# Patient Record
Sex: MALE | Race: WHITE | Employment: FULL TIME | URBAN - METROPOLITAN AREA
[De-identification: names, ages, dates, MRNs, and addresses within clinical notes are randomized per-mention and may not be internally consistent; named-entity substitution may affect disease eponyms.]

---

## 2017-03-28 ENCOUNTER — EMPLOYEE WELLNESS (OUTPATIENT)
Dept: OTHER | Age: 47
End: 2017-03-28

## 2018-03-16 ENCOUNTER — HOSPITAL ENCOUNTER (OUTPATIENT)
Age: 48
Discharge: HOME | End: 2018-03-16
Payer: COMMERCIAL

## 2018-03-16 LAB
ANION GAP SERPL CALCULATED.3IONS-SCNC: 10 MMOL/L (ref 7–16)
BUN BLDV-MCNC: 15 MG/DL (ref 6–20)
CALCIUM SERPL-MCNC: 8.8 MG/DL (ref 8.6–10.2)
CHLORIDE BLD-SCNC: 101 MMOL/L (ref 98–107)
CO2: 28 MMOL/L (ref 22–29)
CREAT SERPL-MCNC: 0.9 MG/DL (ref 0.7–1.2)
GFR AFRICAN AMERICAN: >60
GFR NON-AFRICAN AMERICAN: >60 ML/MIN/1.73
GLUCOSE BLD-MCNC: 148 MG/DL (ref 74–109)
HBA1C MFR BLD: 9 % (ref 4.8–5.9)
POTASSIUM SERPL-SCNC: 4.2 MMOL/L (ref 3.5–5)
SODIUM BLD-SCNC: 139 MMOL/L (ref 132–146)
VITAMIN D 25-HYDROXY: 15 NG/ML (ref 30–100)

## 2018-03-16 PROCEDURE — 36415 COLL VENOUS BLD VENIPUNCTURE: CPT

## 2018-03-16 PROCEDURE — 82306 VITAMIN D 25 HYDROXY: CPT

## 2018-03-16 PROCEDURE — 80048 BASIC METABOLIC PNL TOTAL CA: CPT

## 2018-03-16 PROCEDURE — 83036 HEMOGLOBIN GLYCOSYLATED A1C: CPT

## 2018-03-20 VITALS — WEIGHT: 277 LBS | BODY MASS INDEX: 37.57 KG/M2

## 2018-04-21 ENCOUNTER — EMPLOYEE WELLNESS (OUTPATIENT)
Dept: INTERNAL MEDICINE CLINIC | Age: 48
End: 2018-04-21

## 2018-04-21 LAB
CHOLESTEROL, TOTAL: 231 MG/DL (ref 0–199)
GLUCOSE BLD-MCNC: 178 MG/DL (ref 74–107)
HDLC SERPL-MCNC: 41 MG/DL
LDL CHOLESTEROL CALCULATED: 144 MG/DL (ref 0–99)
TRIGL SERPL-MCNC: 228 MG/DL (ref 0–149)

## 2018-04-22 LAB — HBA1C MFR BLD: 8.7 % (ref 4.8–5.9)

## 2018-05-02 VITALS — WEIGHT: 284 LBS | BODY MASS INDEX: 39.06 KG/M2

## 2018-06-07 ENCOUNTER — HOSPITAL ENCOUNTER (OUTPATIENT)
Age: 48
Discharge: HOME | End: 2018-06-07
Payer: COMMERCIAL

## 2018-06-07 LAB
ANION GAP SERPL CALCULATED.3IONS-SCNC: 12 MMOL/L (ref 7–16)
BUN BLDV-MCNC: 15 MG/DL (ref 6–20)
CALCIUM SERPL-MCNC: 8.8 MG/DL (ref 8.6–10.2)
CHLORIDE BLD-SCNC: 100 MMOL/L (ref 98–107)
CO2: 26 MMOL/L (ref 22–29)
CREAT SERPL-MCNC: 0.8 MG/DL (ref 0.7–1.2)
GFR AFRICAN AMERICAN: >60
GFR NON-AFRICAN AMERICAN: >60 ML/MIN/1.73
GLUCOSE BLD-MCNC: 212 MG/DL (ref 74–109)
HBA1C MFR BLD: 9.1 % (ref 4.8–5.9)
POTASSIUM SERPL-SCNC: 4.2 MMOL/L (ref 3.5–5)
SODIUM BLD-SCNC: 138 MMOL/L (ref 132–146)
VITAMIN D 25-HYDROXY: 20 NG/ML (ref 30–100)

## 2018-06-07 PROCEDURE — 36415 COLL VENOUS BLD VENIPUNCTURE: CPT

## 2018-06-07 PROCEDURE — 82306 VITAMIN D 25 HYDROXY: CPT

## 2018-06-07 PROCEDURE — 83036 HEMOGLOBIN GLYCOSYLATED A1C: CPT

## 2018-06-07 PROCEDURE — 80048 BASIC METABOLIC PNL TOTAL CA: CPT

## 2018-08-21 ENCOUNTER — HOSPITAL ENCOUNTER (OUTPATIENT)
Dept: DIABETES SERVICES | Age: 48
Setting detail: THERAPIES SERIES
Discharge: HOME | End: 2018-08-21
Payer: COMMERCIAL

## 2018-08-21 PROCEDURE — G0109 DIAB MANAGE TRN IND/GROUP: HCPCS | Performed by: DIETITIAN, REGISTERED

## 2018-08-22 ENCOUNTER — HOSPITAL ENCOUNTER (OUTPATIENT)
Dept: DIABETES SERVICES | Age: 48
Setting detail: THERAPIES SERIES
Discharge: HOME | End: 2018-08-22
Payer: COMMERCIAL

## 2018-08-22 PROCEDURE — G0109 DIAB MANAGE TRN IND/GROUP: HCPCS

## 2018-08-24 NOTE — PROGRESS NOTES
1 8/21/18  BDK  3 Testing once a week   Prevention, detection & treatment of acute complications:  Identify symptoms of hyper & hypoglycemia, and prevention & treatment strategies. Describe sick day guidelines & indications for ketone testing & physician notification. Identify short term consequences of poor control. 1 8/21/18  BDK  3    Prevention, detection & treatment of chronic complications:  Define the natural course of diabetes & describe the relationship of blood glucose levels to long term complications of diabetes. Identify preventative measures & standards of care. 1 8/21/18  BDK  3    Developing strategies to address psychosocial issues:  Describe feelings about living with diabetes; Describe how stress, depression & anxiety affect blood glucose; Identify coping strategies; Identify support needed & support network available. 1 8/21/18  BDK  3 PHQ-9 Depression Screen Score: 0  []Physician notified of suicidal ideations   Developing strategies to promote health/change behavior: Identify 7 self-care behaviors; Personal health risk factors; Benefits, challenges & strategies for behavioral change; Individualized goal selection.  1 8/22/18-JA  4 Goal: Test blood sugar in AM and 2 hours after dinner, 5 times a week     Identified Barriers to learning/adherence to self management plan:     [x]None  []Physical  []Visual  []Hearing  []Speech  []Emotional  []Cognitive    []Reading  []Language  []Accessibility  []Financial    Instruction Method: [x]Lecture/Discussion  [x]PowerPoint Presentation  [x]Handouts   []Return Demonstration  Education Materials/Equipment Provided:  [x]Self-Management Manual  []Meal Plan []Glucose Meter  []Insulin Kit  []Nutritional Packet  []Survival Packet   []Other  [] Gestational Pathway Initiated & Signed    Encounter Type Date Start Time End Time Comments No Show Dates   Assessment 8/21/18  BDK 1800 0635   [x]In Person  []Telephone    Session 1 8/21/18 BDK [id number]      Session 2 8/22/18  MAYCOK             1800  [id number]  2030     Session 3         Individual MNT         Gestational MNT         Shared Med Appt         Yearly Follow-up        Meter Instrx        Insulin Instrx      []Pen  []Vial & Syringe      Additional Comments: 8/21/18  Antoinette Siemens attended session one with his significant other. Very attentive.  BDK, Pt did not attend class 3, plans on attended a 1:1 appointment to get his meal plan instruction    DSMS Support Plan:  Follow-up plan/Date: 11/2018  Contact Post Class Regarding:   Fasting Blood Sugar   HgbA1C   Weight   Hypertension/Follow-up with Physician   Self-Foot Exam Frequency   Monitoring Frequency   Exercise Routine   Goal Attainment  Post Education Referrals:      []WIC   []PAP   []Wound Care   []Social Service   [] [facility]ara Silver[facility] Specialist   [][facility]   []Sleep Lab   []Other

## 2018-12-06 ENCOUNTER — HOSPITAL ENCOUNTER (OUTPATIENT)
Age: 48
Discharge: HOME | End: 2018-12-06
Payer: COMMERCIAL

## 2018-12-06 ENCOUNTER — HOSPITAL ENCOUNTER (OUTPATIENT)
Dept: DIABETES SERVICES | Age: 48
Setting detail: THERAPIES SERIES
Discharge: HOME | End: 2018-12-06
Payer: COMMERCIAL

## 2018-12-06 LAB
ANION GAP SERPL CALCULATED.3IONS-SCNC: 9 MMOL/L (ref 7–16)
BUN BLDV-MCNC: 14 MG/DL (ref 6–20)
CALCIUM SERPL-MCNC: 8.9 MG/DL (ref 8.6–10.2)
CHLORIDE BLD-SCNC: 101 MMOL/L (ref 98–107)
CHOLESTEROL, TOTAL: 214 MG/DL (ref 0–199)
CO2: 27 MMOL/L (ref 22–29)
CREAT SERPL-MCNC: 0.8 MG/DL (ref 0.7–1.2)
GFR AFRICAN AMERICAN: >60
GFR NON-AFRICAN AMERICAN: >60 ML/MIN/1.73
GLUCOSE BLD-MCNC: 177 MG/DL (ref 74–99)
HBA1C MFR BLD: 8.7 % (ref 4–5.6)
HDLC SERPL-MCNC: 40 MG/DL
LDL CHOLESTEROL CALCULATED: 145 MG/DL (ref 0–99)
MICROALBUMIN UR-MCNC: <12 MG/L
POTASSIUM SERPL-SCNC: 4.3 MMOL/L (ref 3.5–5)
SODIUM BLD-SCNC: 137 MMOL/L (ref 132–146)
TRIGL SERPL-MCNC: 146 MG/DL (ref 0–149)
VITAMIN D 25-HYDROXY: 18 NG/ML (ref 30–100)
VLDLC SERPL CALC-MCNC: 29 MG/DL

## 2018-12-06 PROCEDURE — 97804 MEDICAL NUTRITION GROUP: CPT

## 2018-12-06 PROCEDURE — 36415 COLL VENOUS BLD VENIPUNCTURE: CPT

## 2018-12-06 PROCEDURE — 80048 BASIC METABOLIC PNL TOTAL CA: CPT

## 2018-12-06 PROCEDURE — 83036 HEMOGLOBIN GLYCOSYLATED A1C: CPT

## 2018-12-06 PROCEDURE — 80061 LIPID PANEL: CPT

## 2018-12-06 PROCEDURE — 82306 VITAMIN D 25 HYDROXY: CPT

## 2018-12-06 PROCEDURE — 82044 UR ALBUMIN SEMIQUANTITATIVE: CPT

## 2018-12-06 NOTE — PROGRESS NOTES
Diabetes Self-Management Education Record    Participant Name: Sara Reed  Referring Provider: Lanre Julien DO  Assessment/Evaluation Ratings:  1=Needs Instruction   4=Demonstrates Understanding/Competency  2=Needs Review   NC=Not Covered    3=Comprehends Key Points  N/A=Not Applicable  Topics/Learning Objectives Pre-session Assess Date:  8/21/18 BDK Instr. Date Reinforce Date Post- session Eval Comments   Diabetes disease process & Treatment process: Define diabetes & pre-diabetes; Identify own type of diabetes; role of the pancreas; signs/symptoms; diagnostic criteria; prevention & treatment options; contributing factors. 1 8/21/18  BDK  3 Type 2      Incorporating nutritional management into lifestyle: Describe effect of type, amount & timing of food on blood glucose; Describe basic meal planning techniques & current nutrition guidelines;Correctly read food labels & demonstrate CHO counting & portion control with personalized meal plan. Identify dining out strategies, & dietary sick day guidelines. 1 8/22/18-JA            12/6/18 DR  3            4 8/22/18 Attended with wife, followed along with handouts,made comments      12/6/18 Pt participated in group activities for meal planning. Instruction provided on a carb-consistent meal plan. Pt was able to choose correct amount of carbohydrate at meals using food models and carbohydrate cheat sheet. Followed along and took notes. Incorporating physical activity into lifestyle:   Verbalize effect of exercise on blood glucose levels; benefits of regular exercise; safety considerations; contraindications; maintenance of activity. 1 8/21/18  BDK  3 none   Using medications safely:  Identify effects of diabetes medicines on blood glucose levels; List diabetes medication taken, action & side effects; appropriate injection sites; proper storage; supplies needed; proper technique; safe needle disposal guidelines.  2 8/21/18  BDK  3 Metformin ER   Monitoring blood

## 2019-03-14 ENCOUNTER — HOSPITAL ENCOUNTER (OUTPATIENT)
Age: 49
Discharge: HOME | End: 2019-03-14
Payer: COMMERCIAL

## 2019-03-14 LAB
ALBUMIN SERPL-MCNC: 4.3 G/DL (ref 3.5–5.2)
ALP BLD-CCNC: 89 U/L (ref 40–129)
ALT SERPL-CCNC: 24 U/L (ref 0–40)
ANION GAP SERPL CALCULATED.3IONS-SCNC: 12 MMOL/L (ref 7–16)
AST SERPL-CCNC: 15 U/L (ref 0–39)
BILIRUB SERPL-MCNC: 0.6 MG/DL (ref 0–1.2)
BILIRUBIN URINE: NEGATIVE
BLOOD, URINE: NEGATIVE
BUN BLDV-MCNC: 15 MG/DL (ref 6–20)
CALCIUM SERPL-MCNC: 8.9 MG/DL (ref 8.6–10.2)
CHLORIDE BLD-SCNC: 100 MMOL/L (ref 98–107)
CHOLESTEROL, TOTAL: 229 MG/DL (ref 0–199)
CLARITY: CLEAR
CO2: 27 MMOL/L (ref 22–29)
COLOR: YELLOW
CREAT SERPL-MCNC: 0.8 MG/DL (ref 0.7–1.2)
GFR AFRICAN AMERICAN: >60
GFR NON-AFRICAN AMERICAN: >60 ML/MIN/1.73
GLUCOSE BLD-MCNC: 188 MG/DL (ref 74–99)
GLUCOSE URINE: NEGATIVE MG/DL
HBA1C MFR BLD: 7.8 % (ref 4–5.6)
HCT VFR BLD CALC: 46.4 % (ref 37–54)
HDLC SERPL-MCNC: 42 MG/DL
HEMOGLOBIN: 15 G/DL (ref 12.5–16.5)
KETONES, URINE: NEGATIVE MG/DL
LDL CHOLESTEROL CALCULATED: 153 MG/DL (ref 0–99)
LEUKOCYTE ESTERASE, URINE: NEGATIVE
MCH RBC QN AUTO: 27.6 PG (ref 26–35)
MCHC RBC AUTO-ENTMCNC: 32.3 % (ref 32–34.5)
MCV RBC AUTO: 85.5 FL (ref 80–99.9)
NITRITE, URINE: NEGATIVE
PDW BLD-RTO: 13.6 FL (ref 11.5–15)
PH UA: 5 (ref 5–9)
PLATELET # BLD: 366 E9/L (ref 130–450)
PMV BLD AUTO: 10.5 FL (ref 7–12)
POTASSIUM SERPL-SCNC: 4.3 MMOL/L (ref 3.5–5)
PROTEIN UA: NEGATIVE MG/DL
RBC # BLD: 5.43 E12/L (ref 3.8–5.8)
SODIUM BLD-SCNC: 139 MMOL/L (ref 132–146)
SPECIFIC GRAVITY UA: >=1.03 (ref 1–1.03)
TOTAL PROTEIN: 7.3 G/DL (ref 6.4–8.3)
TRIGL SERPL-MCNC: 172 MG/DL (ref 0–149)
TSH SERPL DL<=0.05 MIU/L-ACNC: 1.48 UIU/ML (ref 0.27–4.2)
UROBILINOGEN, URINE: 0.2 E.U./DL
VLDLC SERPL CALC-MCNC: 34 MG/DL
WBC # BLD: 9.5 E9/L (ref 4.5–11.5)

## 2019-03-14 PROCEDURE — 83036 HEMOGLOBIN GLYCOSYLATED A1C: CPT

## 2019-03-14 PROCEDURE — 80061 LIPID PANEL: CPT

## 2019-03-14 PROCEDURE — 36415 COLL VENOUS BLD VENIPUNCTURE: CPT

## 2019-03-14 PROCEDURE — 81003 URINALYSIS AUTO W/O SCOPE: CPT

## 2019-03-14 PROCEDURE — 84443 ASSAY THYROID STIM HORMONE: CPT

## 2019-03-14 PROCEDURE — 85027 COMPLETE CBC AUTOMATED: CPT

## 2019-03-14 PROCEDURE — 80053 COMPREHEN METABOLIC PANEL: CPT

## 2019-03-18 ENCOUNTER — HOSPITAL ENCOUNTER (OUTPATIENT)
Dept: GENERAL RADIOLOGY | Age: 49
Discharge: HOME | End: 2019-03-20
Payer: COMMERCIAL

## 2019-03-18 ENCOUNTER — HOSPITAL ENCOUNTER (OUTPATIENT)
Age: 49
Discharge: HOME | End: 2019-03-20
Payer: COMMERCIAL

## 2019-03-18 DIAGNOSIS — M25.531 RIGHT WRIST PAIN: ICD-10-CM

## 2019-03-18 PROCEDURE — 73110 X-RAY EXAM OF WRIST: CPT

## 2019-04-18 ENCOUNTER — OFFICE VISIT (OUTPATIENT)
Dept: ORTHOPEDIC SURGERY | Age: 49
End: 2019-04-18
Payer: COMMERCIAL

## 2019-04-18 VITALS
WEIGHT: 280 LBS | TEMPERATURE: 97.4 F | DIASTOLIC BLOOD PRESSURE: 76 MMHG | RESPIRATION RATE: 18 BRPM | HEART RATE: 66 BPM | BODY MASS INDEX: 37.93 KG/M2 | SYSTOLIC BLOOD PRESSURE: 117 MMHG | HEIGHT: 72 IN

## 2019-04-18 DIAGNOSIS — S63.591A TFCC (TRIANGULAR FIBROCARTILAGE COMPLEX) TEAR, RIGHT, INITIAL ENCOUNTER: Primary | ICD-10-CM

## 2019-04-18 PROCEDURE — 99203 OFFICE O/P NEW LOW 30 MIN: CPT | Performed by: ORTHOPAEDIC SURGERY

## 2019-04-18 PROCEDURE — L3908 WHO COCK-UP NONMOLDE PRE OTS: HCPCS | Performed by: ORTHOPAEDIC SURGERY

## 2019-04-18 PROCEDURE — 20605 DRAIN/INJ JOINT/BURSA W/O US: CPT | Performed by: ORTHOPAEDIC SURGERY

## 2019-04-18 RX ORDER — EZETIMIBE 10 MG/1
TABLET ORAL
COMMUNITY
Start: 2019-03-18

## 2019-04-18 RX ORDER — BETAMETHASONE SODIUM PHOSPHATE AND BETAMETHASONE ACETATE 3; 3 MG/ML; MG/ML
6 INJECTION, SUSPENSION INTRA-ARTICULAR; INTRALESIONAL; INTRAMUSCULAR; SOFT TISSUE ONCE
Status: COMPLETED | OUTPATIENT
Start: 2019-04-18 | End: 2019-04-18

## 2019-04-18 RX ADMIN — BETAMETHASONE SODIUM PHOSPHATE AND BETAMETHASONE ACETATE 6 MG: 3; 3 INJECTION, SUSPENSION INTRA-ARTICULAR; INTRALESIONAL; INTRAMUSCULAR; SOFT TISSUE at 15:22

## 2019-04-18 NOTE — PROGRESS NOTES
Department of Orthopedic Surgery/Hand Surgery  Resident Office Note        CHIEF COMPLAINT:    Chief Complaint   Patient presents with    Wrist Pain     Right wrist pain       History Obtained From:  patient    HISTORY OF PRESENT ILLNESS:                Daily Rivas is a 57y.o. year old  male complaining this pain for the last 4 months. Having right is pain occasionally during activities. Patient states the pain comes and goes and is not constant. Patient's not tried any bracing or oral NSAIDs at this time. States that rest improves the pain. And activities aggravates the right wrist. Patient denies any previous injuries to the right wrist. Patient's right dominant. Patient works as an IT        Past Medical History:    Past Medical History:   Diagnosis Date    Gallstone     with nausea    H/O seasonal allergies      Past Surgical History:    Past Surgical History:   Procedure Laterality Date    CHOLECYSTECTOMY, LAPAROSCOPIC  1/10/14     Current Medications:   Current Facility-Administered Medications: betamethasone acetate-betamethasone sodium phosphate (CELESTONE) injection 6 mg, 6 mg, Intra-articular, Once  Allergies:     Allergies   Allergen Reactions    Glipizide Other (See Comments)     Extreme muscle pain    Simvastatin Diarrhea       Social History:   Social History     Socioeconomic History    Marital status:      Spouse name: Not on file    Number of children: Not on file    Years of education: Not on file    Highest education level: Not on file   Occupational History    Not on file   Social Needs    Financial resource strain: Not on file    Food insecurity:     Worry: Not on file     Inability: Not on file    Transportation needs:     Medical: Not on file     Non-medical: Not on file   Tobacco Use    Smoking status: Never Smoker    Smokeless tobacco: Never Used   Substance and Sexual Activity    Alcohol use: Yes     Comment: seldom    Drug use: No    Sexual activity: Not on file   Lifestyle    Physical activity:     Days per week: Not on file     Minutes per session: Not on file    Stress: Not on file   Relationships    Social connections:     Talks on phone: Not on file     Gets together: Not on file     Attends Anabaptist service: Not on file     Active member of club or organization: Not on file     Attends meetings of clubs or organizations: Not on file     Relationship status: Not on file    Intimate partner violence:     Fear of current or ex partner: Not on file     Emotionally abused: Not on file     Physically abused: Not on file     Forced sexual activity: Not on file   Other Topics Concern    Not on file   Social History Narrative    Not on file     Family History:   History reviewed. No pertinent family history. REVIEW OF SYSTEMS:    CONSTITUTIONAL:  negative for  fevers, chills, sweats and fatigue  RESPIRATORY:  negative for  dry cough, cough with sputum, dyspnea, wheezing and chest pain  CARDIOVASCULAR:  negative for  chest pain, dyspnea, palpitations, syncope  GASTROINTESTINAL:  negative for nausea, vomiting, change in bowel habits, diarrhea, constipation and abdominal pain  BEHAVIOR/PSYCH:  negative for poor appetite, increased appetite, decreased sleep and poor concentration  MUSCULOSKELETAL:  positive for  pain      PHYSICAL EXAM:    VITALS:  /76 (Site: Left Upper Arm, Position: Sitting)   Pulse 66   Temp 97.4 °F (36.3 °C) (Oral)   Resp 18   Ht 6' (1.829 m)   Wt 280 lb (127 kg)   BMI 37.97 kg/m²     CONSTITUTIONAL:  awake, alert, cooperative, no apparent distress, and appears stated age    LUNGS:  No increased work of breathing, good air exchange, clear to auscultation bilaterally, no crackles or wheezing    CARDIOVASCULAR:  Normal apical impulse, regular rate and rhythm, normal S1 and S2, no S3 or S4, and no murmur noted    ABDOMEN: Soft, Non-tender to palpation       right upper exam:    The skin overlying the hand is  intact.    The motion in the small joints of the hand are intact with no stiffness or deformity. There is no masses or adenopathy in bilateral upper extremities. Radial pulses are 2+ and symmetric bilaterally. Capillary refill is intact and < 2 seconds. Motor strength is 5/5 with flexion and extension of the small finger joints. The ROM to fingers are normal. Positive synergistic test, positive tenderness at ECU tendon. Negative tenderness of the medial lateral condyles of the elbow, positive Tinel's at the elbow negative tenderness with resisted wrist extension and wrist flexion, negative tenderness with resisted extension of the index and middle fingers, positive fovea tenderness     Right:  Phallens sign positive, Tinnells sign positive, Median nerve compression test negative ,  Finklesteins negative, CMC Grind test negative,       DATA:    Radiology Review:  right wirst XR - 3 views - AP/Lateral/Oblique    In no acute fractures dislocations, no known arthritic changes, no soft tissue swelling  Impression : Normal right wrist x-ray    IMPRESSION/RECOMMENDATIONS:      Impression:   Encounter Diagnosis   Name Primary?  TFCC (triangular fibrocartilage complex) tear, right, initial encounter Yes       Discussed diagnosis and treatment options with patient today. We'll recommend a TFCC cortisone injection. And wrist immobilization. Patient agreement with this plan. Patient can follow-up in 4-6 weeks for repeat evaluation. If patient is asymptomatic patient appointment time. Procedure Note TFCC Injection     The Right TFCC was identified as the injection site. The risk and benefits of a cortisone injection were explained and the patient consented to the injection. Under sterile conditions, the digit was injected with a mixture of 1 mL of 1% Lidocaine and 1 mL of 6 mg/mL Betamethasone without complication. A sterile bandage was applied.     I have seen and evaluated the patient and agree with the above assessment and plan on today's visit. I have performed the key components of the history and physical examination with significant findings of right ulnar-sided wrist pain/TFCC tear. I concur with the findings and plan as documented.     Oniel Barakat MD  4/18/2019

## 2019-05-11 ENCOUNTER — EMPLOYEE WELLNESS (OUTPATIENT)
Dept: OTHER | Age: 49
End: 2019-05-11

## 2019-05-11 LAB
CHOLESTEROL, TOTAL: 177 MG/DL (ref 0–199)
GLUCOSE BLD-MCNC: 131 MG/DL (ref 74–107)
HDLC SERPL-MCNC: 37 MG/DL
LDL CHOLESTEROL CALCULATED: 112 MG/DL (ref 0–99)
TRIGL SERPL-MCNC: 140 MG/DL (ref 0–149)

## 2019-05-12 LAB — HBA1C MFR BLD: 7.9 % (ref 4–5.6)

## 2019-05-20 VITALS — BODY MASS INDEX: 38.79 KG/M2 | WEIGHT: 286 LBS

## 2019-06-09 ENCOUNTER — HOSPITAL ENCOUNTER (OUTPATIENT)
Age: 49
Discharge: HOME | End: 2019-06-09
Payer: COMMERCIAL

## 2019-06-09 LAB
ALBUMIN SERPL-MCNC: 4.3 G/DL (ref 3.5–5.2)
ALP BLD-CCNC: 94 U/L (ref 40–129)
ALT SERPL-CCNC: 20 U/L (ref 0–40)
AST SERPL-CCNC: 14 U/L (ref 0–39)
BILIRUB SERPL-MCNC: 0.3 MG/DL (ref 0–1.2)
BILIRUBIN DIRECT: <0.2 MG/DL (ref 0–0.3)
BILIRUBIN, INDIRECT: NORMAL MG/DL (ref 0–1)
CHOLESTEROL, FASTING: 173 MG/DL (ref 0–199)
HDLC SERPL-MCNC: 40 MG/DL
LDL CHOLESTEROL CALCULATED: 110 MG/DL (ref 0–99)
TOTAL PROTEIN: 7.1 G/DL (ref 6.4–8.3)
TRIGLYCERIDE, FASTING: 115 MG/DL (ref 0–149)
VLDLC SERPL CALC-MCNC: 23 MG/DL

## 2019-06-09 PROCEDURE — 80076 HEPATIC FUNCTION PANEL: CPT

## 2019-06-09 PROCEDURE — 80061 LIPID PANEL: CPT

## 2019-06-09 PROCEDURE — 36415 COLL VENOUS BLD VENIPUNCTURE: CPT

## 2019-07-18 ENCOUNTER — OFFICE VISIT (OUTPATIENT)
Dept: ORTHOPEDIC SURGERY | Age: 49
End: 2019-07-18
Payer: COMMERCIAL

## 2019-07-18 VITALS — SYSTOLIC BLOOD PRESSURE: 122 MMHG | RESPIRATION RATE: 20 BRPM | HEART RATE: 78 BPM | DIASTOLIC BLOOD PRESSURE: 88 MMHG

## 2019-07-18 DIAGNOSIS — S63.591A TFCC (TRIANGULAR FIBROCARTILAGE COMPLEX) TEAR, RIGHT, INITIAL ENCOUNTER: Primary | ICD-10-CM

## 2019-07-18 PROCEDURE — 99212 OFFICE O/P EST SF 10 MIN: CPT | Performed by: ORTHOPAEDIC SURGERY

## 2019-07-18 NOTE — PROGRESS NOTES
Department of Orthopedic Surgery/Hand Surgery  Resident Office Note        CHIEF COMPLAINT:    Chief Complaint   Patient presents with    Follow-up     TFCC Rt tear        History Obtained From:  patient    HISTORY OF PRESENT ILLNESS:                Rajiv Hagen is a 57y.o. year old  male here for follow up of left TFCC pain. He received an injection his last appointment and has been doing well. He only complains of left weakness. Past Medical History:    Past Medical History:   Diagnosis Date    Gallstone     with nausea    H/O seasonal allergies      Past Surgical History:    Past Surgical History:   Procedure Laterality Date    CHOLECYSTECTOMY, LAPAROSCOPIC  1/10/14       History reviewed. No pertinent family history. REVIEW OF SYSTEMS:    CONSTITUTIONAL:  negative for  fevers, chills, sweats and fatigue  RESPIRATORY:  negative for  dry cough, cough with sputum, dyspnea, wheezing and chest pain  CARDIOVASCULAR:  negative for  chest pain, dyspnea, palpitations, syncope  GASTROINTESTINAL:  negative for nausea, vomiting, change in bowel habits, diarrhea, constipation and abdominal pain  BEHAVIOR/PSYCH:  negative for poor appetite, increased appetite, decreased sleep and poor concentration  MUSCULOSKELETAL:  positive for  pain      PHYSICAL EXAM:    VITALS:  /88 (Site: Left Upper Arm, Position: Sitting, Cuff Size: Medium Adult)   Pulse 78   Resp 20     CONSTITUTIONAL:  awake, alert, cooperative, no apparent distress, and appears stated age    right upper exam:    The skin overlying the hand is  intact. No tenderness over TFCC. The motion in the small joints of the hand are intact with no stiffness or deformity. There is no masses or adenopathy in bilateral upper extremities. Radial pulses are 2+ and symmetric bilaterally. Capillary refill is intact and < 2 seconds. Motor strength is 5/5 with flexion and extension of the small finger joints.   The ROM to fingers are

## 2019-07-25 ENCOUNTER — EVALUATION (OUTPATIENT)
Dept: OCCUPATIONAL THERAPY | Age: 49
End: 2019-07-25
Payer: COMMERCIAL

## 2019-07-25 DIAGNOSIS — S69.81XD INJURY OF TRIANGULAR FIBROCARTILAGE COMPLEX (TFCC) OF RIGHT WRIST, SUBSEQUENT ENCOUNTER: ICD-10-CM

## 2019-07-25 PROCEDURE — 97530 THERAPEUTIC ACTIVITIES: CPT | Performed by: OCCUPATIONAL THERAPIST

## 2019-07-25 PROCEDURE — 97165 OT EVAL LOW COMPLEX 30 MIN: CPT | Performed by: OCCUPATIONAL THERAPIST

## 2019-07-25 NOTE — PROGRESS NOTES
[facility] INITIAL [facility]ALU[facility]     Phone: [phone]  Fax: [phone]     Date:  2019  Initial Evaluation Date: 2019    Patient Name:  Aminah Gross    :  1970    Restrictions/Precautions:  TFCC injury conservative approach, low fall risk  Diagnosis:  S63.591A (ICD-10-CM) - TFCC (triangular fibrocartilage complex) tear, right, initial encounter  Insurance/Certification information:  [employer] [place]  Referring Practitioner:  Dr. Snehal Cooper MD  Date of Surgery/Injury: Initial symptoms began 2018. Was seen by Physician May of 2019. Plan of care signed (Y/N):  No  Visit# / total visits:  if needed    Past Medical History:   Past Medical History:   Diagnosis Date    Gallstone     with nausea    H/O seasonal allergies      Past Surgical History:   Past Surgical History:   Procedure Laterality Date    CHOLECYSTECTOMY, LAPAROSCOPIC  1/10/14       Reason for Referral: Pt is a pleasant 56 yo male presenting to outpatient occupational therapy s/p wrist pain. He reports in 2018 he was experiencing pain to his R wrist. He reports he waited a few months before seeking medical advice. He was referred to Dr. Esdras Sun and assessed. He was found to have a TFCC tear and was treated conservatively using a R wrist cock-up splint and a received a Cortizone Injection. He now presents after a period of immobilization and is hoping to improve overall strength and function    Home Living: PT lives at home with his wife and children. Able to navigate all skyler of his home. Prior Level of Function: independent    Pain Level: 0/10 pain per patient report. He reports that when he completes some tasks such as throwing a frisbee and shooting a basketball can increase his pain.     Cognition:   Alert/Oriented x3     IADL History  Homemaking Responsibility: Ind  Shopping Responsibility: Ind  Mode of Transportation: Ind  Leisure & Hobbies: Ind  Work: Ind, Works in IT at [address] Formulation: Patient  Time In: 7:00 am            Time Out: 7:50 am                      Timed Code Treatment Minutes: 50 minutes        PLAN      Treatment to include:   [x] Instruction in HEP                   Modalities:  [x] Therapeutic Exercise [x] Ultrasound      [x] Electrical Stimulation/Attended  [x] PROM/Stretching                   [x] Fluidotherapy          [x]  Paraffin                   []AAROM  [] AROM             [] Iontophoresis: 4 mg/mL; Dexamethasone Sodium           [] Desensitization                               [] Neuromuscular Re-education    [x] Splinting       [x] Therapeutic Activity            [x] Pain Management with/without modalities PRN                 [x] Manual Therapy/Fascial release        [x] ADL/IADL re-training        [x] Tendon Glides                   [x]Joint Protection/Training  []Ergonomics                             [x] Joint Mobilization        []Adaptive Equipment Assessment/Training                             [] Manual Edema Mobilization    [] Energy Conservation/Work Simplification  [] GM/FM Coordination  [x]  Safety retraining/education per  individual diagnosis/goals    Patient Specific Goal: Patient would like to improve his R hand function and ROM. GOALS (Long term same as Short term): 6 weeks  1) Patient will demonstrate good understanding of home program (exercises/activities/diagnosis/prognosis/goals) with good accuracy. 2) Patient will demonstrate increased active/passive range of motion of their Right wrist in ulnar deviation by 10* so he can independently complete all tasks at work. 3) Patient will demonstrate increased  strength of at least 10  pounds of their Right hand. 4) Patient to report decreased pain in their affected Right upper extremity from 4/10 to 2/10 or less with resistive functional use. 5) Patient to report 100% compliance with their splint wear, care, and precautions if needed. Patient.

## 2019-08-01 ENCOUNTER — TREATMENT (OUTPATIENT)
Dept: OCCUPATIONAL THERAPY | Age: 49
End: 2019-08-01
Payer: COMMERCIAL

## 2019-08-01 DIAGNOSIS — S63.591A PARTIAL SCAPHOLUNATE TEAR, RIGHT, INITIAL ENCOUNTER: ICD-10-CM

## 2019-08-01 PROCEDURE — 97110 THERAPEUTIC EXERCISES: CPT | Performed by: OCCUPATIONAL THERAPIST

## 2019-08-01 PROCEDURE — 97530 THERAPEUTIC ACTIVITIES: CPT | Performed by: OCCUPATIONAL THERAPIST

## 2019-08-01 PROCEDURE — 97140 MANUAL THERAPY 1/> REGIONS: CPT | Performed by: OCCUPATIONAL THERAPIST

## 2019-10-11 ENCOUNTER — HOSPITAL ENCOUNTER (OUTPATIENT)
Age: 49
Discharge: HOME | End: 2019-10-11
Payer: COMMERCIAL

## 2019-10-11 LAB
ALBUMIN SERPL-MCNC: 4.1 G/DL (ref 3.5–5.2)
ALP BLD-CCNC: 84 U/L (ref 40–129)
ALT SERPL-CCNC: 12 U/L (ref 0–40)
ANION GAP SERPL CALCULATED.3IONS-SCNC: 13 MMOL/L (ref 7–16)
AST SERPL-CCNC: 11 U/L (ref 0–39)
BILIRUB SERPL-MCNC: 0.7 MG/DL (ref 0–1.2)
BUN BLDV-MCNC: 23 MG/DL (ref 6–20)
CALCIUM SERPL-MCNC: 8.8 MG/DL (ref 8.6–10.2)
CHLORIDE BLD-SCNC: 103 MMOL/L (ref 98–107)
CHOLESTEROL, TOTAL: 172 MG/DL (ref 0–199)
CO2: 24 MMOL/L (ref 22–29)
CREAT SERPL-MCNC: 0.9 MG/DL (ref 0.7–1.2)
GFR AFRICAN AMERICAN: >60
GFR NON-AFRICAN AMERICAN: >60 ML/MIN/1.73
GLUCOSE BLD-MCNC: 127 MG/DL (ref 74–99)
HBA1C MFR BLD: 7.3 % (ref 4–5.6)
HCT VFR BLD CALC: 48.6 % (ref 37–54)
HDLC SERPL-MCNC: 44 MG/DL
HEMOGLOBIN: 15.4 G/DL (ref 12.5–16.5)
LDL CHOLESTEROL CALCULATED: 99 MG/DL (ref 0–99)
MCH RBC QN AUTO: 27 PG (ref 26–35)
MCHC RBC AUTO-ENTMCNC: 31.7 % (ref 32–34.5)
MCV RBC AUTO: 85.1 FL (ref 80–99.9)
PDW BLD-RTO: 13.8 FL (ref 11.5–15)
PLATELET # BLD: 394 E9/L (ref 130–450)
PMV BLD AUTO: 10 FL (ref 7–12)
POTASSIUM SERPL-SCNC: 4 MMOL/L (ref 3.5–5)
RBC # BLD: 5.71 E12/L (ref 3.8–5.8)
SODIUM BLD-SCNC: 140 MMOL/L (ref 132–146)
TOTAL PROTEIN: 7 G/DL (ref 6.4–8.3)
TRIGL SERPL-MCNC: 143 MG/DL (ref 0–149)
TSH SERPL DL<=0.05 MIU/L-ACNC: 1.09 UIU/ML (ref 0.27–4.2)
VLDLC SERPL CALC-MCNC: 29 MG/DL
WBC # BLD: 12.5 E9/L (ref 4.5–11.5)

## 2019-10-11 PROCEDURE — 36415 COLL VENOUS BLD VENIPUNCTURE: CPT

## 2019-10-11 PROCEDURE — 83036 HEMOGLOBIN GLYCOSYLATED A1C: CPT

## 2019-10-11 PROCEDURE — 80061 LIPID PANEL: CPT

## 2019-10-11 PROCEDURE — 84443 ASSAY THYROID STIM HORMONE: CPT

## 2019-10-11 PROCEDURE — 80053 COMPREHEN METABOLIC PANEL: CPT

## 2019-10-11 PROCEDURE — 85027 COMPLETE CBC AUTOMATED: CPT

## 2020-02-14 ENCOUNTER — HOSPITAL ENCOUNTER (OUTPATIENT)
Age: 50
Discharge: HOME | End: 2020-02-14
Payer: COMMERCIAL

## 2020-02-14 LAB
ANION GAP SERPL CALCULATED.3IONS-SCNC: 12 MMOL/L (ref 7–16)
BUN BLDV-MCNC: 20 MG/DL (ref 6–20)
CALCIUM SERPL-MCNC: 9.2 MG/DL (ref 8.6–10.2)
CHLORIDE BLD-SCNC: 103 MMOL/L (ref 98–107)
CO2: 25 MMOL/L (ref 22–29)
CREAT SERPL-MCNC: 0.9 MG/DL (ref 0.7–1.2)
CREATININE URINE: 171 MG/DL (ref 40–278)
GFR AFRICAN AMERICAN: >60
GFR NON-AFRICAN AMERICAN: >60 ML/MIN/1.73
GLUCOSE BLD-MCNC: 135 MG/DL (ref 74–99)
HBA1C MFR BLD: 6.9 % (ref 4–5.6)
MICROALBUMIN UR-MCNC: <12 MG/L
MICROALBUMIN/CREAT UR-RTO: ABNORMAL (ref 0–30)
POTASSIUM SERPL-SCNC: 4.3 MMOL/L (ref 3.5–5)
SODIUM BLD-SCNC: 140 MMOL/L (ref 132–146)
VITAMIN D 25-HYDROXY: 18 NG/ML (ref 30–100)

## 2020-02-14 PROCEDURE — 82306 VITAMIN D 25 HYDROXY: CPT

## 2020-02-14 PROCEDURE — 83036 HEMOGLOBIN GLYCOSYLATED A1C: CPT

## 2020-02-14 PROCEDURE — 80048 BASIC METABOLIC PNL TOTAL CA: CPT

## 2020-02-14 PROCEDURE — 36415 COLL VENOUS BLD VENIPUNCTURE: CPT

## 2020-02-14 PROCEDURE — 82044 UR ALBUMIN SEMIQUANTITATIVE: CPT

## 2020-02-14 PROCEDURE — 82570 ASSAY OF URINE CREATININE: CPT

## 2020-09-17 ENCOUNTER — HOSPITAL ENCOUNTER (OUTPATIENT)
Age: 50
Discharge: HOME | End: 2020-09-17
Payer: COMMERCIAL

## 2020-09-17 LAB
ALBUMIN SERPL-MCNC: 4.1 G/DL (ref 3.5–5.2)
ALP BLD-CCNC: 92 U/L (ref 40–129)
ALT SERPL-CCNC: 17 U/L (ref 0–40)
ANION GAP SERPL CALCULATED.3IONS-SCNC: 10 MMOL/L (ref 7–16)
AST SERPL-CCNC: 14 U/L (ref 0–39)
BILIRUB SERPL-MCNC: 0.6 MG/DL (ref 0–1.2)
BUN BLDV-MCNC: 18 MG/DL (ref 6–20)
CALCIUM SERPL-MCNC: 9.1 MG/DL (ref 8.6–10.2)
CHLORIDE BLD-SCNC: 102 MMOL/L (ref 98–107)
CHOLESTEROL, TOTAL: 186 MG/DL (ref 0–199)
CO2: 27 MMOL/L (ref 22–29)
CREAT SERPL-MCNC: 0.9 MG/DL (ref 0.7–1.2)
GFR AFRICAN AMERICAN: >60
GFR NON-AFRICAN AMERICAN: >60 ML/MIN/1.73
GLUCOSE BLD-MCNC: 139 MG/DL (ref 74–99)
HBA1C MFR BLD: 7.6 % (ref 4–5.6)
HCT VFR BLD CALC: 48.6 % (ref 37–54)
HDLC SERPL-MCNC: 43 MG/DL
HEMOGLOBIN: 15.4 G/DL (ref 12.5–16.5)
LDL CHOLESTEROL CALCULATED: 118 MG/DL (ref 0–99)
MCH RBC QN AUTO: 27.5 PG (ref 26–35)
MCHC RBC AUTO-ENTMCNC: 31.7 % (ref 32–34.5)
MCV RBC AUTO: 86.9 FL (ref 80–99.9)
PDW BLD-RTO: 13.5 FL (ref 11.5–15)
PLATELET # BLD: 372 E9/L (ref 130–450)
PMV BLD AUTO: 9.8 FL (ref 7–12)
POTASSIUM SERPL-SCNC: 4.2 MMOL/L (ref 3.5–5)
PROSTATE SPECIFIC ANTIGEN: 0.68 NG/ML (ref 0–4)
RBC # BLD: 5.59 E12/L (ref 3.8–5.8)
SODIUM BLD-SCNC: 139 MMOL/L (ref 132–146)
TOTAL PROTEIN: 6.8 G/DL (ref 6.4–8.3)
TRIGL SERPL-MCNC: 127 MG/DL (ref 0–149)
TSH SERPL DL<=0.05 MIU/L-ACNC: 1.18 UIU/ML (ref 0.27–4.2)
VITAMIN D 25-HYDROXY: 25 NG/ML (ref 30–100)
VLDLC SERPL CALC-MCNC: 25 MG/DL
WBC # BLD: 8.8 E9/L (ref 4.5–11.5)

## 2020-09-17 PROCEDURE — 36415 COLL VENOUS BLD VENIPUNCTURE: CPT

## 2020-09-17 PROCEDURE — 83036 HEMOGLOBIN GLYCOSYLATED A1C: CPT

## 2020-09-17 PROCEDURE — 82306 VITAMIN D 25 HYDROXY: CPT

## 2020-09-17 PROCEDURE — 80061 LIPID PANEL: CPT

## 2020-09-17 PROCEDURE — 84153 ASSAY OF PSA TOTAL: CPT

## 2020-09-17 PROCEDURE — 80053 COMPREHEN METABOLIC PANEL: CPT

## 2020-09-17 PROCEDURE — 84443 ASSAY THYROID STIM HORMONE: CPT

## 2020-09-17 PROCEDURE — 85027 COMPLETE CBC AUTOMATED: CPT

## 2020-10-06 ENCOUNTER — HOSPITAL ENCOUNTER (OUTPATIENT)
Age: 50
Discharge: HOME | End: 2020-10-08
Payer: COMMERCIAL

## 2020-10-06 ENCOUNTER — NURSE ONLY (OUTPATIENT)
Dept: PRIMARY CARE CLINIC | Age: 50
End: 2020-10-06

## 2020-10-06 PROCEDURE — U0003 INFECTIOUS AGENT DETECTION BY NUCLEIC ACID (DNA OR RNA); SEVERE ACUTE RESPIRATORY SYNDROME CORONAVIRUS 2 (SARS-COV-2) (CORONAVIRUS DISEASE [COVID-19]), AMPLIFIED PROBE TECHNIQUE, MAKING USE OF HIGH THROUGHPUT TECHNOLOGIES AS DESCRIBED BY CMS-2020-01-R: HCPCS

## 2020-10-08 LAB
SARS-COV-2: NOT DETECTED
SOURCE: NORMAL

## 2021-02-25 ENCOUNTER — TELEPHONE (OUTPATIENT)
Dept: PHARMACY | Facility: CLINIC | Age: 51
End: 2021-02-25

## 2021-02-25 NOTE — TELEPHONE ENCOUNTER
Patient called in to refill his medication.  I let him know ill have to transfer him to [facility]

## 2021-09-03 LAB
CHOLESTEROL, TOTAL: 189 MG/DL (ref 0–199)
GLUCOSE BLD-MCNC: 173 MG/DL (ref 74–107)
HDLC SERPL-MCNC: 42 MG/DL
LDL CHOLESTEROL CALCULATED: 123 MG/DL (ref 0–99)
TRIGL SERPL-MCNC: 118 MG/DL (ref 0–149)

## 2021-09-04 LAB — HBA1C MFR BLD: 8.1 % (ref 4–5.6)

## 2022-04-26 ENCOUNTER — HOSPITAL ENCOUNTER (EMERGENCY)
Age: 52
Discharge: HOME | End: 2022-04-26
Payer: COMMERCIAL

## 2022-04-26 VITALS
TEMPERATURE: 97.1 F | BODY MASS INDEX: 37.25 KG/M2 | SYSTOLIC BLOOD PRESSURE: 121 MMHG | DIASTOLIC BLOOD PRESSURE: 85 MMHG | OXYGEN SATURATION: 98 % | HEART RATE: 96 BPM | WEIGHT: 275 LBS | HEIGHT: 72 IN | RESPIRATION RATE: 18 BRPM

## 2022-04-26 DIAGNOSIS — J01.90 ACUTE SINUSITIS, RECURRENCE NOT SPECIFIED, UNSPECIFIED LOCATION: Primary | ICD-10-CM

## 2022-04-26 PROCEDURE — 99211 OFF/OP EST MAY X REQ PHY/QHP: CPT

## 2022-04-26 RX ORDER — PREDNISONE 20 MG/1
TABLET ORAL
Qty: 8 TABLET | Refills: 0 | Status: SHIPPED | OUTPATIENT
Start: 2022-04-26

## 2022-04-26 RX ORDER — AMOXICILLIN AND CLAVULANATE POTASSIUM 875; 125 MG/1; MG/1
1 TABLET, FILM COATED ORAL 2 TIMES DAILY
Qty: 14 TABLET | Refills: 0 | Status: SHIPPED | OUTPATIENT
Start: 2022-04-26 | End: 2022-05-03

## 2022-04-26 RX ORDER — CETIRIZINE HYDROCHLORIDE, PSEUDOEPHEDRINE HYDROCHLORIDE 5; 120 MG/1; MG/1
1 TABLET, FILM COATED, EXTENDED RELEASE ORAL 2 TIMES DAILY PRN
Qty: 12 TABLET | Refills: 0 | Status: SHIPPED | OUTPATIENT
Start: 2022-04-26

## 2022-04-26 ASSESSMENT — PAIN DESCRIPTION - FREQUENCY: FREQUENCY: CONTINUOUS

## 2022-04-26 ASSESSMENT — PAIN DESCRIPTION - DESCRIPTORS: DESCRIPTORS: PRESSURE

## 2022-04-26 ASSESSMENT — PAIN DESCRIPTION - PAIN TYPE: TYPE: ACUTE PAIN

## 2022-04-26 ASSESSMENT — PAIN DESCRIPTION - LOCATION: LOCATION: HEAD

## 2022-04-26 ASSESSMENT — PAIN SCALES - GENERAL: PAINLEVEL_OUTOF10: 4

## 2022-04-26 ASSESSMENT — PAIN DESCRIPTION - ONSET: ONSET: GRADUAL

## 2022-04-26 ASSESSMENT — PAIN - FUNCTIONAL ASSESSMENT: PAIN_FUNCTIONAL_ASSESSMENT: 0-10

## 2022-04-26 NOTE — ED PROVIDER NOTES
[facility]  Department of Emergency Medicine  UC Encounter Note  22   6:36 PM EDT      NAME: Boo Weldon  :  1970  MRN:  [id number]    Chief Complaint: Headache and Nasal Congestion      This is a 51-year-old male who presents to urgent care complaining of sinus symptoms upper respiratory symptoms for the past couple days. Complains mostly of runny nose and nasal congestion and sinus pain and pressure. Denies any cough. No chest pain or shortness of breath. No abdominal pain nausea vomiting diarrhea or urinary symptoms. On first contact patient he appears to be in no acute distress. Review of Systems  Pertinent positives and negatives are stated within HPI, all other systems reviewed and are negative. Physical Exam  Vitals and nursing note reviewed. Constitutional:       Appearance: He is well-developed. HENT:      Head: Normocephalic and atraumatic. Jaw: There is normal jaw occlusion. No trismus. Right Ear: Hearing, ear canal and external ear normal. Tympanic membrane is bulging. Left Ear: Hearing, ear canal and external ear normal. Tympanic membrane is bulging. Nose: Congestion and rhinorrhea present. Right Sinus: Maxillary sinus tenderness and frontal sinus tenderness present. Left Sinus: Maxillary sinus tenderness and frontal sinus tenderness present. Mouth/Throat:      Mouth: Mucous membranes are moist. No angioedema. Pharynx: Oropharynx is clear. Uvula midline. No uvula swelling. Eyes:      General: Lids are normal.      Conjunctiva/sclera: Conjunctivae normal.      Pupils: Pupils are equal, round, and reactive to light. Cardiovascular:      Rate and Rhythm: Normal rate and regular rhythm. Heart sounds: Normal heart sounds. No murmur heard. Pulmonary:      Effort: Pulmonary effort is normal.      Breath sounds: Normal breath sounds.    Abdominal:      General: Bowel sounds are normal. Palpations: Abdomen is soft. Abdomen is not rigid. Tenderness: There is no abdominal tenderness. There is no guarding or rebound. Musculoskeletal:      Cervical back: Normal range of motion and neck supple. Skin:     General: Skin is warm and dry. Findings: No abrasion or rash. Neurological:      Mental Status: He is alert and oriented to person, place, and time. GCS: GCS eye subscore is 4. GCS verbal subscore is 5. GCS motor subscore is 6. Cranial Nerves: No cranial nerve deficit. Sensory: No sensory deficit. Coordination: Coordination normal.      Gait: Gait normal.         Procedures    MDM  Number of Diagnoses or Management Options  Acute sinusitis, recurrence not specified, unspecified location  Diagnosis management comments: No acute distress. Placed on medications. Instructions given. Patient does state he can tolerate steroids. I did tell him that he will raise his blood sugar temporarily. Instructions given           --------------------------------------------- PAST HISTORY ---------------------------------------------  Past Medical History:  has a past medical history of Diabetes mellitus ([facility] [address] Gallstone, H/O seasonal allergies, and Hyperlipidemia. Past Surgical History:  has a past surgical history that includes Cholecystectomy, laparoscopic (1/10/14). Social History:  reports that he has never smoked. He has never used smokeless tobacco. He reports current alcohol use. He reports that he does not use drugs. Family History: family history is not on file. The patients home medications have been reviewed. Allergies: Glipizide and Simvastatin    -------------------------------------------------- RESULTS -------------------------------------------------  No results found for this visit on 04/26/22.   No orders to display       ------------------------- NURSING NOTES AND VITALS REVIEWED ---------------------------   The nursing notes within the ED encounter and vital signs as below have been reviewed. /85   Pulse 96   Temp 97.1 °F (36.2 °C) (Infrared)   Resp 18   Ht 6' (1.829 m)   Wt 275 lb (124.7 kg)   SpO2 98%   BMI 37.30 kg/m²   Oxygen Saturation Interpretation: Normal      ------------------------------------------ PROGRESS NOTES ------------------------------------------   I have spoken with the patient and discussed todays results, in addition to providing specific details for the plan of care and counseling regarding the diagnosis and prognosis. Their questions are answered at this time and they are agreeable with the plan.      --------------------------------- ADDITIONAL PROVIDER NOTES ---------------------------------     This patient is stable for discharge. I have shared the specific conditions for return, as well as the importance of follow-up. * NOTE: This report was transcribed using voice recognition software. Every effort was made to ensure accuracy; however, inadvertent computerized transcription errors may be present.    --------------------------------- IMPRESSION AND DISPOSITION ---------------------------------    IMPRESSION  1.  Acute sinusitis, recurrence not specified, unspecified location        DISPOSITION  Disposition: Discharge to home  Patient condition is good       Gabbie Malcolm PA-C  04/26/22 [id number]

## 2022-06-02 ENCOUNTER — OFFICE VISIT (OUTPATIENT)
Dept: FAMILY MEDICINE CLINIC | Age: 52
End: 2022-06-02
Payer: COMMERCIAL

## 2022-06-02 VITALS
TEMPERATURE: 97.4 F | WEIGHT: 284 LBS | OXYGEN SATURATION: 97 % | RESPIRATION RATE: 16 BRPM | HEART RATE: 85 BPM | DIASTOLIC BLOOD PRESSURE: 80 MMHG | HEIGHT: 72 IN | SYSTOLIC BLOOD PRESSURE: 124 MMHG | BODY MASS INDEX: 38.47 KG/M2

## 2022-06-02 DIAGNOSIS — J01.90 ACUTE SINUSITIS, RECURRENCE NOT SPECIFIED, UNSPECIFIED LOCATION: Primary | ICD-10-CM

## 2022-06-02 DIAGNOSIS — R05.9 COUGH: ICD-10-CM

## 2022-06-02 DIAGNOSIS — R09.81 NASAL CONGESTION: ICD-10-CM

## 2022-06-02 LAB
Lab: NORMAL
PERFORMING INSTRUMENT: NORMAL
QC PASS/FAIL: NORMAL
SARS-COV-2, POC: NORMAL

## 2022-06-02 PROCEDURE — 87426 SARSCOV CORONAVIRUS AG IA: CPT

## 2022-06-02 PROCEDURE — 99213 OFFICE O/P EST LOW 20 MIN: CPT

## 2022-06-02 RX ORDER — METHYLPREDNISOLONE 4 MG/1
TABLET ORAL
Qty: 1 KIT | Refills: 0 | Status: SHIPPED | OUTPATIENT
Start: 2022-06-02

## 2022-06-02 RX ORDER — CEFDINIR 300 MG/1
300 CAPSULE ORAL 2 TIMES DAILY
Qty: 14 CAPSULE | Refills: 0 | Status: SHIPPED | OUTPATIENT
Start: 2022-06-02 | End: 2022-06-09

## 2022-06-02 NOTE — PROGRESS NOTES
Chief Complaint       Cough (Started on Tuesday. He is taking an allergy med. ) and Nasal Congestion (Started on Monday. )    History of Present Illness   Source of history provided by:  patient. Audra Garcia is a 54 y.o. old male presenting to the walk in clinic for evaluation of nasal congestion, nasal drainage, sinus congestion, mild productive cough with yellow and sore throat x 3 days. Has been taking Zyrtec OTC without relief. Denies any fever, chills, wheezing, CP, SOB, or GI symptoms. Denies any hx of asthma, COPD, or tobacco use. Denies any contact with any individuals with known COVID-19 infection or under investigation for COVID-19 infection. Pt has been vaccinated for COVID-19. ROS    Unless otherwise stated in this report or unable to obtain because of the patient's clinical or mental status as evidenced by the medical record, this patients's positive and negative responses for Review of Systems, constitutional, psych, eyes, ENT, cardiovascular, respiratory, gastrointestinal, neurological, genitourinary, musculoskeletal, integument systems and systems related to the presenting problem are either stated in the preceding or were not pertinent or were negative for the symptoms and/or complaints related to the medical problem. Physical Exam         VS:  /80   Pulse 85   Temp 97.4 °F (36.3 °C) (Temporal)   Resp 16   Ht 6' (1.829 m)   Wt 284 lb (128.8 kg)   SpO2 97%   BMI 38.52 kg/m²    Oxygen Saturation Interpretation: Normal.    Constitutional:  Alert, development consistent with age. Ears:  External Ears: Bilateral pinna normal. TMs translucent without erythema or perforation bilaterally. Canals normal bilaterally without swelling or exudate  Nose:  Mild congestion of the nasal mucosa. There is Mild injection to middle turbinates bilaterally. No tenderness over sinuses. Throat: Mild posterior pharyngeal erythema with mild post nasal drip present.   No exudate or tonsillar hypertrophy noted. Neck:  Supple. There is no anterior cervical adenopathy. Lungs: CTAB without wheezes, rales, or rhonchi  Heart:  Regular rate and rhythm, normal heart sounds, without pathological murmurs, ectopy, gallops, or rubs. Skin:  Normal turgor. Warm, dry, without visible rash. Neurological:  Alert and oriented. Motor functions intact. Responds to verbal commands. Lab / Imaging Results   (All laboratory and radiology results have been personally reviewed by myself)  Labs:  No results found for this visit on 06/02/22. Imaging: All Radiology results interpreted by Radiologist unless otherwise noted. Assessment / Plan     Impression(s):  Ambar Diop was seen today for cough and nasal congestion. Diagnoses and all orders for this visit:    Acute sinusitis, recurrence not specified, unspecified location  -     methylPREDNISolone (MEDROL DOSEPACK) 4 MG tablet; Take by mouth. -     cefdinir (OMNICEF) 300 MG capsule; Take 1 capsule by mouth 2 times daily for 7 days    Cough  -     POCT COVID-19, Antigen  -     methylPREDNISolone (MEDROL DOSEPACK) 4 MG tablet; Take by mouth. -     cefdinir (OMNICEF) 300 MG capsule; Take 1 capsule by mouth 2 times daily for 7 days    Nasal congestion  -     methylPREDNISolone (MEDROL DOSEPACK) 4 MG tablet; Take by mouth. -     cefdinir (OMNICEF) 300 MG capsule; Take 1 capsule by mouth 2 times daily for 7 days      Disposition:  Disposition: Discharge to home. Increase fluids and rest. Script written for Cefdinir and medrol dose pack, side effects discussed. Additional symptomatic relief discussed. PCP in 5-7 days if symptoms persist. ED sooner if symptoms worsen or change. Red flag symptoms discussed. Pt is in agreement with this care plan. All questions answered. INGRIS Mejía    **This report was transcribed using voice recognition software.  Every effort was made to ensure accuracy; however, inadvertent computerized transcription errors may be present.

## 2022-07-20 LAB
CHOLESTEROL, TOTAL: 202 MG/DL (ref 0–199)
GLUCOSE BLD-MCNC: 160 MG/DL (ref 74–107)
HDLC SERPL-MCNC: 41 MG/DL
LDL CHOLESTEROL CALCULATED: 136 MG/DL (ref 0–99)
TRIGL SERPL-MCNC: 124 MG/DL (ref 0–149)

## 2023-01-02 ENCOUNTER — HOSPITAL ENCOUNTER (OUTPATIENT)
Dept: SLEEP CENTER | Age: 53
Discharge: HOME | End: 2023-01-02
Payer: COMMERCIAL

## 2023-01-02 VITALS — HEIGHT: 72 IN | WEIGHT: 275 LBS | HEART RATE: 89 BPM | BODY MASS INDEX: 37.25 KG/M2 | OXYGEN SATURATION: 93 %

## 2023-01-02 PROCEDURE — 95810 POLYSOM 6/> YRS 4/> PARAM: CPT

## 2023-01-02 ASSESSMENT — SLEEP AND FATIGUE QUESTIONNAIRES
HOW LIKELY ARE YOU TO NOD OFF OR FALL ASLEEP WHILE SITTING INACTIVE IN A PUBLIC PLACE: 0
HOW LIKELY ARE YOU TO NOD OFF OR FALL ASLEEP WHILE WATCHING TV: 1
HOW LIKELY ARE YOU TO NOD OFF OR FALL ASLEEP WHILE LYING DOWN TO REST IN THE AFTERNOON WHEN CIRCUMSTANCES PERMIT: 2
HOW LIKELY ARE YOU TO NOD OFF OR FALL ASLEEP WHILE SITTING AND TALKING TO SOMEONE: 0
HOW LIKELY ARE YOU TO NOD OFF OR FALL ASLEEP WHILE SITTING AND READING: 0
HOW LIKELY ARE YOU TO NOD OFF OR FALL ASLEEP IN A CAR, WHILE STOPPED FOR A FEW MINUTES IN TRAFFIC: 0
HOW LIKELY ARE YOU TO NOD OFF OR FALL ASLEEP WHEN YOU ARE A PASSENGER IN A CAR FOR AN HOUR WITHOUT A BREAK: 0
HOW LIKELY ARE YOU TO NOD OFF OR FALL ASLEEP WHILE SITTING QUIETLY AFTER LUNCH WITHOUT ALCOHOL: 0
ESS TOTAL SCORE: 3

## 2023-01-05 NOTE — PROGRESS NOTES
[address] [address]                               SLEEP STUDY REPORT    PATIENT NAME: Blessing Dupree                      :        1970  MED REC NO:   [id number]                            ROOM:  ACCOUNT NO:   [de-identified]                           ADMIT DATE: 2023  PROVIDER:     Bonifacio Sanders MD    DATE OF STUDY:  2023    REFERRING PROVIDER:  Yaa Leroy DO    STUDY PERFORMED:  Polysomnography. INDICATION FOR STUDY:  Witnessed apnea, wakes gasping, loud snoring,  restless sleep, and trouble with memory/concentration. CURRENT MEDICATIONS:  Jardiance, glimepiride, and ezetimibe. INTERPRETATION:  SLEEP ARCHITECTURE:  This patient had a total time in bed of 104  minutes. Total sleep time was 14 minutes. Sleep efficiency was 14% and  sleep latency was 45 minutes. REM latency was not observed. SLEEP STAGING:  The patient was awake 86% of the time in bed. Stage N1  was 25% and N2 was 75% of the total sleep time. Slow wave sleep and REM  stage sleep were absent. RESPIRATION SUMMARY:  There were no apneic or hypopneic events. The  apnea/hypopnea index is zero. AROUSAL ANALYSIS:  There were three arousals/awakenings, the sleep  disruption index is 13; (normal less than 10). LIMB MOVEMENT SUMMARY:  There were no limb movements. OXYGEN SATURATION:  Average oxygen saturation while awake was 93%. Lowest saturation was 91%. HEART RATE SUMMARY:  Average heart rate while awake was 87 beats per  minute. Maximum heart rate during sleep was 91 beats per minute and  minimum heart rate during sleep was 79 beats per minute. There were no  ectopic beats noted. MISCELLANEOUS:  [place] Sleepiness Scale score is 3/24. Snoring was  mild. This was graded as 3 on a 1 to 10 scale. There was no apparent  bruxism. IMPRESSION:  1. Insufficient sleep time to make any diagnosis.   2.  Good oxygen saturation. 3.  No cardiac dysrhythmia. 4.  Mild snoring. DISCUSSION:  This patient took a very long time to fall asleep and then,  only slept for approximately 14 minutes. Therefore, no statement can be  made as far as sleep apnea. The patient apparently had difficulty with  the wires on his face and ended the study signing out against medical  advice. Options at this point include repeating the study with a  sleep aid such as Ambien or, doing a home sleep study. Using a WatchPAT  device may have less wires than other home sleep devices. SUGGESTIONS:  1.  Dr. Keenan Armando to discuss the results of study with the patient. 2.  Not enough information to justify treatment at this time. 3.  Consider repeating the study with the use of a sleep aid such as  Ambien or doing a home sleep apnea test with a WatchPAT monitoring  device.         Paloma Albarado MD  Diplomat of Sleep Medicine    D: 01/04/2023 12:43:31       T: 01/04/2023 12:46:03     [email or web address]  Job#: [id number]     Doc#: [id number]    CC:

## 2023-01-12 ENCOUNTER — PREP FOR PROCEDURE (OUTPATIENT)
Dept: SURGERY | Age: 53
End: 2023-01-12

## 2023-01-12 ENCOUNTER — TELEPHONE (OUTPATIENT)
Dept: SURGERY | Age: 53
End: 2023-01-12

## 2023-01-12 ENCOUNTER — OFFICE VISIT (OUTPATIENT)
Dept: SURGERY | Age: 53
End: 2023-01-12
Payer: COMMERCIAL

## 2023-01-12 VITALS
BODY MASS INDEX: 37.79 KG/M2 | HEART RATE: 82 BPM | WEIGHT: 279 LBS | DIASTOLIC BLOOD PRESSURE: 82 MMHG | HEIGHT: 72 IN | SYSTOLIC BLOOD PRESSURE: 142 MMHG | TEMPERATURE: 97.2 F | OXYGEN SATURATION: 97 %

## 2023-01-12 DIAGNOSIS — Z12.11 ENCOUNTER FOR SCREENING COLONOSCOPY: Primary | ICD-10-CM

## 2023-01-12 PROCEDURE — 99202 OFFICE O/P NEW SF 15 MIN: CPT | Performed by: SURGERY

## 2023-01-12 RX ORDER — SODIUM, POTASSIUM,MAG SULFATES 17.5-3.13G
1 SOLUTION, RECONSTITUTED, ORAL ORAL 2 TIMES DAILY
Qty: 1 EACH | Refills: 0 | Status: SHIPPED | OUTPATIENT
Start: 2023-01-12 | End: 2023-01-13

## 2023-01-12 RX ORDER — GLIMEPIRIDE 1 MG/1
TABLET ORAL
COMMUNITY
Start: 2022-11-29

## 2023-01-12 NOTE — PROGRESS NOTES
[facility]  HISTORY & PHYSICAL      CC:  Colorectal cancer screening         HPI:     Ahsan Meza is here as New patient(1/12/2023). The patient was sent here to discuss colorectal cancer screening. The patient denies any weight loss, rectal bleeding, abdominal pain, or change in bowel habits. There is no family history of IBD or colorectal cancer. The patient has never had a screening colonoscopy. Pt works for IT at [employer]    Past Medical History:   Diagnosis Date    Diabetes mellitus ([facility] [address]     Gallstone     with nausea    H/O seasonal allergies     Hyperlipidemia      Past Surgical History:   Procedure Laterality Date    CHOLECYSTECTOMY, LAPAROSCOPIC  1/10/14     Social History     Socioeconomic History    Marital status:      Spouse name: Not on file    Number of children: Not on file    Years of education: Not on file    Highest education level: Not on file   Occupational History    Not on file   Tobacco Use    Smoking status: Never    Smokeless tobacco: Never   Substance and Sexual Activity    Alcohol use: Yes     Comment: seldom    Drug use: No    Sexual activity: Not on file   Other Topics Concern    Not on file   Social History Narrative    Not on file     Social Determinants of Health     Financial Resource Strain: Not on file   Food Insecurity: Not on file   Transportation Needs: Not on file   Physical Activity: Not on file   Stress: Not on file   Social Connections: Not on file   Intimate Partner Violence: Not on file   Housing Stability: Not on file     Allergies   Allergen Reactions    Glipizide Other (See Comments)     Extreme muscle pain    Simvastatin Diarrhea        I have reviewed and confirmed the past medical history, surgical history, social history, allergies in the chart. Medications: I have reviewed the medication list in the chart.     Review of Systems:  Review of Systems - History obtained from the patient  General ROS: negative  Psychological ROS: negative  Ophthalmic ROS: negative for - blurry vision, double vision or loss of vision  ENT ROS: negative for - epistaxis, sore throat, vocal changes or malocclusion  Respiratory ROS: negative for - pleuritic pain, shortness of breath or tachypnea  Cardiovascular ROS: negative for - chest pain or palpitations  Gastrointestinal ROS: negative for - abdominal pain or gas/bloating  Genito-Urinary ROS: negative for - hematuria or pelvic pain  Endocrine ROS: negative   Heme ROS: negative   Musculoskeletal ROS: negative  Neurological ROS: negative for - confusion, dizziness, headaches, numbness/tingling, seizures or weakness    Physical Exam   BP (!) 142/82   Pulse 82   Temp 97.2 °F (36.2 °C)   Ht 6' (1.829 m)   Wt 279 lb (126.6 kg)   SpO2 97%   BMI 37.84 kg/m²   Vitals:    01/12/23 1430   BP: (!) 142/82   Pulse: 82   Temp: 97.2 °F (36.2 °C)   SpO2: 97%       PSYCH: mood and affect normal, alert and oriented x 3  CONSTITUTIONAL: No apparent distress, comfortable  EYES: Sclera white, pupils equal round and reactive to light  ENMT:  Hearing normal, trachea midline, ears externally intact  LYMPH: no lympadenopathy in neck. No lympadenopathy in groins  RESP: Breath sounds were clear and equal with no rales, wheezes, or rhonchi. Respiratory effort was normal with no retractions or use of accessory muscles. CV: Heart sounds were normal with a regular rate and rhythm. No pedal edema  GI/ Abdomen: The abdomen was soft and non distended. There was no tenderness, guarding, rebound, or rigidity. There was no                     masses, hepatosplenomegaly, or hernias.   Rectal -Deferred  MSK: no clubbing/ no cyanosis/ gait normal       Assessment:    Average risk for colorectal cancer     Plan:  Screening colonoscopy  I discussed the options for colorectal cancer screening with the patient including options of fecal occult blood testing with flexible sigmoidoscopy or air contrast barium enema.  I also discussed the risks and benefits of colonoscopy with possible biopsy/polypectomy/cauterization. I recommended colonoscopy with deep sedation. The patient understands the risks of bleeding and perforation (<0.1%) and agrees to proceed.   2 days of clear liquids prior to procedure  Suprep split dose prep  Schedule at Crow Diehl MD, [address]  1/12/2023  2:41 PM

## 2023-01-12 NOTE — TELEPHONE ENCOUNTER
Prior Authorization Form:      DEMOGRAPHICS:                     Patient Name:  Elvie Mueller  Patient :  1970            Insurance:  Payor: Jose Gilliland / Plan: [employer] [facility] [id number] / Product Type: *No Product type* /   Insurance ID Number:    Payer/Plan Subscr  Sex Relation Sub. Ins. ID Effective Group Num   1.  [address] 1970 Male Self [id number]                                   [address]         DIAGNOSIS & PROCEDURE:                       Procedure/Operation: Screening Colonoscopy           CPT Code: 82852    Diagnosis:  screening    ICD10 Code: z12.11    Location:  [facility]      Surgeon:  Jose Lee MD       INFORMATION:                          Date: 2023    Time: 815              Anesthesia:  MAC/TIVA                                                       Status:  Outpatient          Electronically signed by Vicente Peter MA on 2023 at 2:55 PM

## 2023-01-12 NOTE — PATIENT INSTRUCTIONS
If you have any questions, please call Angela at [address]    It is very important that you follow all of the instructions listed on this sheet carefully (they may be slightly different than the directions on the product that you purchase at the pharmacy) to ensure that your colon is adequately cleaned out or your risk of complications could be increased. 2 Days or More Before Endoscopy:  Hannah Costello from the pharmacy. Do not eat corn, tomatoes, peas or watermelon 3 to 5 days before procedure. Two days before the colonoscopy, fill both of the 16 oz bottles in the [facility] with water and put in the refrigerator to get cold  Start clear liquid diet 2 days before the procedure  If you are on INSULIN or OTHER DIABETIC MEDICATIONS, then check with your primary care physician as to how to adjust your medication while on clear liquid diet and when nothing by mouth. 1 Day Before the Endoscopy:  No solid food - only clear liquids (soup, jello, or juice that you can see through with no solid food) for breakfast, lunch and supper. DO NOT drink or eat anything that is red as it will turn the inside of the colon red and look like blood. Have at least 8 oz or more of clear liquids for breakfast (7 am to 8 am) and lunch (11:30 am to 12:30 pm). 1:00 pm Drink at least 8 oz of clear liquids. 3:00 pm Drink at least 8 oz of clear liquids. 4:00 pm Drink one of the 16 oz bottles of the SUPREP followed immediately by at least 8 oz of clear liquids. 5:00 pm Drink at least 8 oz of clear liquids. Can continue to take  clear liquids    Day of Endoscopy:  4 hours prior to scheduled time for colonoscopy, drink the second bottle of SUPREP followed immediately by at least 8 oz of clear liquids.   STOP ALL CLEAR LIQUIDS 2 HOURS PRIOR TO SCHEDULED TIME  If any blood pressure medications or heart medications are due in the morning, you should take them with a sip of water. Instructions for Clear liquid diet  Definition  A clear liquid diet consists of clear liquids, such as water, broth and plain gelatin, that are easily digested and leave no undigested residue in your intestinal tract. Your doctor may prescribe a clear liquid diet before certain medical procedures or if you have certain digestive problems. Because a clear liquid diet can't provide you with adequate calories and nutrients, it shouldn't be continued for more than a few days. Purpose  A clear liquid diet is often used before tests, procedures or surgeries that require no food in your stomach or intestines, such as before colonoscopy. It may also be recommended as a short-term diet if you have certain digestive problems, such as nausea, vomiting or diarrhea, or after certain types of surgery. Diet details  A clear liquid diet helps maintain adequate hydration, provides some important electrolytes, such as sodium and potassium, and gives some energy at a time when a full diet isn't possible or recommended. The following foods are allowed in a clear liquid diet:   Plain water   Fruit juices without pulp, such as apple juice, grape juice or cranberry juice   Strained lemonade or fruit punch   Clear, fat-free broth (bouillon or consomme)   Clear sodas   Plain gelatin   Honey   Ice pops without bits of fruit or fruit pulp   Tea or coffee without milk or cream    Any foods not on the above list should be avoided. Also, for certain tests, such as colon exams, your doctor may ask you to avoid liquids or gelatin with red coloring.      A typical menu on the clear liquid diet may look like this:     Breakfast:  1 glass fruit juice  1 cup coffee or tea (without dairy products)  1 cup broth  1 bowl gelatin     Snack:  1 glass fruit juice  1 bowl gelatin     Lunch:  1 glass fruit juice  1 glass water  1 cup broth  1 bowl gelatin     Snack:  1 ice pop (without fruit pulp)  1 cup coffee or tea (without dairy products) or a soft drink     Dinner:  1 cup juice or water  1 cup broth  1 bowl gelatin  1 cup coffee or tea     Results  Although the clear liquid diet may not be very exciting, it does fulfill its purpose. It's designed to keep your stomach and intestines clear, limit strain to your digestive system, but keep your body hydrated as you prepare for or recover from a medical procedure. Risks  Because a clear liquid diet can't provide you with adequate calories and nutrients, it shouldn't be used for more than a few days. Only use the clear liquid diet as directed by your doctor. If your doctor prescribes a clear liquid diet before a medical test, be sure to follow the diet instructions exactly. If you don't follow the diet exactly, you risk an inaccurate test and may have to reschedule the procedure for another time. The importance of proper hydration  A colonoscopy prep causes the body to lose a significant amount of fluid and can result in sickness due to dehydration. It's important that you prepare your body by drinking extra clear liquids before the prep. Stay hydrated by drinking all required clear liquids during the prep. Replenish your system by drinking clear liquids after returning home from your colonoscopy.

## 2023-01-12 NOTE — PROGRESS NOTES
MA Scheduled pt for Colonoscopy on 04/24/2023 at 0815. Pt needs to arrive at [facility] at 5433. Patient confirmed date and time. Address and directions given.    Electronically signed by Kenia Klein MA on 1/12/23 at 2:52 PM EST

## 2023-02-11 PROBLEM — Z12.11 SCREEN FOR COLON CANCER: Status: RESOLVED | Noted: 2023-01-12 | Resolved: 2023-02-11

## 2023-04-19 RX ORDER — LORATADINE 10 MG/1
CAPSULE, LIQUID FILLED ORAL
COMMUNITY
Start: 2017-05-15

## 2023-04-19 RX ORDER — MONTELUKAST SODIUM 10 MG/1
TABLET ORAL
COMMUNITY
Start: 2017-05-15

## 2023-04-19 RX ORDER — METFORMIN HYDROCHLORIDE 500 MG/1
TABLET, EXTENDED RELEASE ORAL
COMMUNITY
Start: 2018-06-11

## 2023-04-19 RX ORDER — GLIMEPIRIDE 1 MG/1
1 TABLET ORAL
COMMUNITY

## 2023-04-24 ENCOUNTER — ANESTHESIA EVENT (OUTPATIENT)
Dept: ENDOSCOPY | Age: 53
End: 2023-04-24
Payer: COMMERCIAL

## 2023-04-24 ENCOUNTER — HOSPITAL ENCOUNTER (OUTPATIENT)
Age: 53
Setting detail: OUTPATIENT SURGERY
Discharge: HOME | End: 2023-04-24
Attending: SURGERY | Admitting: SURGERY
Payer: COMMERCIAL

## 2023-04-24 ENCOUNTER — ANESTHESIA (OUTPATIENT)
Dept: ENDOSCOPY | Age: 53
End: 2023-04-24
Payer: COMMERCIAL

## 2023-04-24 VITALS
HEART RATE: 75 BPM | BODY MASS INDEX: 37.93 KG/M2 | OXYGEN SATURATION: 97 % | TEMPERATURE: 97 F | SYSTOLIC BLOOD PRESSURE: 121 MMHG | HEIGHT: 72 IN | DIASTOLIC BLOOD PRESSURE: 78 MMHG | RESPIRATION RATE: 18 BRPM | WEIGHT: 280 LBS

## 2023-04-24 DIAGNOSIS — Z12.11 SCREEN FOR COLON CANCER: ICD-10-CM

## 2023-04-24 DIAGNOSIS — Z01.812 PRE-OPERATIVE LABORATORY EXAMINATION: Primary | ICD-10-CM

## 2023-04-24 LAB — METER GLUCOSE: 206 MG/DL (ref 74–99)

## 2023-04-24 PROCEDURE — 3700000001 HC ADD 15 MINUTES (ANESTHESIA): Performed by: SURGERY

## 2023-04-24 PROCEDURE — 45378 DIAGNOSTIC COLONOSCOPY: CPT | Performed by: SURGERY

## 2023-04-24 PROCEDURE — 3609027000 HC COLONOSCOPY: Performed by: SURGERY

## 2023-04-24 PROCEDURE — 2580000003 HC RX 258

## 2023-04-24 PROCEDURE — 2709999900 HC NON-CHARGEABLE SUPPLY: Performed by: SURGERY

## 2023-04-24 PROCEDURE — 7100000010 HC PHASE II RECOVERY - FIRST 15 MIN: Performed by: SURGERY

## 2023-04-24 PROCEDURE — 2580000003 HC RX 258: Performed by: SURGERY

## 2023-04-24 PROCEDURE — 7100000011 HC PHASE II RECOVERY - ADDTL 15 MIN: Performed by: SURGERY

## 2023-04-24 PROCEDURE — 82962 GLUCOSE BLOOD TEST: CPT

## 2023-04-24 PROCEDURE — 6360000002 HC RX W HCPCS

## 2023-04-24 PROCEDURE — 2500000003 HC RX 250 WO HCPCS

## 2023-04-24 PROCEDURE — 3700000000 HC ANESTHESIA ATTENDED CARE: Performed by: SURGERY

## 2023-04-24 RX ORDER — LIDOCAINE HYDROCHLORIDE 20 MG/ML
INJECTION, SOLUTION INFILTRATION; PERINEURAL PRN
Status: DISCONTINUED | OUTPATIENT
Start: 2023-04-24 | End: 2023-04-24 | Stop reason: SDUPTHER

## 2023-04-24 RX ORDER — SODIUM CHLORIDE 0.9 % (FLUSH) 0.9 %
5-40 SYRINGE (ML) INJECTION EVERY 12 HOURS SCHEDULED
Status: DISCONTINUED | OUTPATIENT
Start: 2023-04-24 | End: 2023-04-24 | Stop reason: HOSPADM

## 2023-04-24 RX ORDER — SODIUM CHLORIDE 0.9 % (FLUSH) 0.9 %
5-40 SYRINGE (ML) INJECTION PRN
Status: DISCONTINUED | OUTPATIENT
Start: 2023-04-24 | End: 2023-04-24 | Stop reason: HOSPADM

## 2023-04-24 RX ORDER — SODIUM CHLORIDE 9 MG/ML
INJECTION, SOLUTION INTRAVENOUS CONTINUOUS
Status: DISCONTINUED | OUTPATIENT
Start: 2023-04-24 | End: 2023-04-24 | Stop reason: HOSPADM

## 2023-04-24 RX ORDER — PROPOFOL 10 MG/ML
INJECTION, EMULSION INTRAVENOUS PRN
Status: DISCONTINUED | OUTPATIENT
Start: 2023-04-24 | End: 2023-04-24 | Stop reason: SDUPTHER

## 2023-04-24 RX ORDER — SODIUM CHLORIDE 9 MG/ML
INJECTION, SOLUTION INTRAVENOUS CONTINUOUS PRN
Status: DISCONTINUED | OUTPATIENT
Start: 2023-04-24 | End: 2023-04-24 | Stop reason: SDUPTHER

## 2023-04-24 RX ORDER — SODIUM CHLORIDE 9 MG/ML
25 INJECTION, SOLUTION INTRAVENOUS PRN
Status: DISCONTINUED | OUTPATIENT
Start: 2023-04-24 | End: 2023-04-24 | Stop reason: HOSPADM

## 2023-04-24 RX ADMIN — PROPOFOL 240 MG: 10 INJECTION, EMULSION INTRAVENOUS at 08:28

## 2023-04-24 RX ADMIN — SODIUM CHLORIDE: 9 INJECTION, SOLUTION INTRAVENOUS at 08:27

## 2023-04-24 RX ADMIN — SODIUM CHLORIDE: 9 INJECTION, SOLUTION INTRAVENOUS at 07:36

## 2023-04-24 RX ADMIN — LIDOCAINE HYDROCHLORIDE 25 MG: 20 INJECTION, SOLUTION INFILTRATION; PERINEURAL at 08:28

## 2023-04-24 ASSESSMENT — PAIN SCALES - GENERAL
PAINLEVEL_OUTOF10: 0

## 2023-04-24 ASSESSMENT — LIFESTYLE VARIABLES: SMOKING_STATUS: 0

## 2023-04-24 ASSESSMENT — PAIN - FUNCTIONAL ASSESSMENT: PAIN_FUNCTIONAL_ASSESSMENT: 0-10

## 2023-04-24 NOTE — ANESTHESIA PRE PROCEDURE
Department of Anesthesiology  Preprocedure Note       Name:  Vanessa Garcia   Age:  50 y.o.  :  1970                                          MRN:  [id number]         Date:  2023      Surgeon: Magdalena Lopez):  Hilda Jacques MD    Procedure: Procedure(s):  LOW SCREENING TOTAL COLONOSCOPY, POSSIBLE BIOPSY    Medications prior to admission:   Prior to Admission medications    Medication Sig Start Date End Date Taking? Authorizing Provider   loratadine (CLARITIN) 10 MG capsule Take by mouth 5/15/17  Yes Historical Provider, MD   metFORMIN (GLUCOPHAGE-XR) 500 MG extended release tablet Take by mouth 18  Yes Historical Provider, MD   montelukast (SINGULAIR) 10 MG tablet Take by mouth 5/15/17  Yes Historical Provider, MD   glimepiride (AMARYL) 1 MG tablet Take 1 tablet by mouth every morning (before breakfast)   Yes Historical Provider, MD   Empagliflozin (JARDIANCE PO) Take by mouth    Historical Provider, MD   cetirizine-psuedoephedrine (ZYRTEC-D) 5-120 MG per extended release tablet Take 1 tablet by mouth 2 times daily as needed for Allergies (congestion/runny nose) 22   Rosario Nail, PA-C   ezetimibe (ZETIA) 10 MG tablet Take by mouth 3/18/19   Historical Provider, MD       Current medications:    Current Facility-Administered Medications   Medication Dose Route Frequency Provider Last Rate Last Admin    0.9 % sodium chloride infusion   IntraVENous Continuous Hilda Jacques MD        sodium chloride flush 0.9 % injection 5-40 mL  5-40 mL IntraVENous 2 times per day Hilda Jacques MD        sodium chloride flush 0.9 % injection 5-40 mL  5-40 mL IntraVENous PRN Hilda Jacques MD        0.9 % sodium chloride infusion  25 mL IntraVENous PRN Hilda Jacques MD           Allergies:     Allergies   Allergen Reactions    Glipizide Other (See Comments)     Extreme muscle pain    Simvastatin Diarrhea       Problem List:    Patient Active Problem List   Diagnosis Code    Type 2 diabetes mellitus

## 2023-04-24 NOTE — ANESTHESIA POSTPROCEDURE EVALUATION
Department of Anesthesiology  Postprocedure Note    Patient: Michael Lane  MRN: [id number]  YOB: 1970  Date of evaluation: 4/24/2023      Procedure Summary     Date: 04/24/23 Room / Location: [address] [place] [address] / [facility]    Anesthesia Start: 9944 Anesthesia Stop: 7982    Procedure: LOW SCREENING TOTAL COLONOSCOPY, POSSIBLE BIOPSY Diagnosis:       Screen for colon cancer      (Screen for colon cancer [Z12.11])    Surgeons: Christine Madrigal MD Responsible Provider: Ligia Verma MD    Anesthesia Type: MAC ASA Status: 3          Anesthesia Type: MAC    Jenni Phase I: Jenni Score: 10    Jenni Phase II: Jenni Score: 10      Anesthesia Post Evaluation    Patient location during evaluation: PACU  Patient participation: complete - patient participated  Level of consciousness: awake and alert  Airway patency: patent  Nausea & Vomiting: no nausea and no vomiting  Complications: no  Cardiovascular status: hemodynamically stable  Respiratory status: acceptable  Hydration status: euvolemic  There was medical reason for not using a multimodal analgesia pain management approach.

## 2023-04-24 NOTE — H&P
[facility]  HISTORY & PHYSICAL      CC:  Colorectal cancer screening         HPI:     Sarah Sampson is here as Established patient(4/24/2023). The patient was sent here to discuss colorectal cancer screening. The patient denies any weight loss, rectal bleeding, abdominal pain, or change in bowel habits. There is no family history of IBD or colorectal cancer. The patient has never had a screening colonoscopy. Past Medical History:   Diagnosis Date    Diabetes mellitus ([address]     Gallstone     with nausea    H/O seasonal allergies     Hyperlipidemia      Past Surgical History:   Procedure Laterality Date    CHOLECYSTECTOMY, LAPAROSCOPIC  1/10/14     Social History     Socioeconomic History    Marital status:      Spouse name: Not on file    Number of children: Not on file    Years of education: Not on file    Highest education level: Not on file   Occupational History    Not on file   Tobacco Use    Smoking status: Never    Smokeless tobacco: Never   Vaping Use    Vaping Use: Never used   Substance and Sexual Activity    Alcohol use: Yes     Comment: seldom    Drug use: No    Sexual activity: Not on file   Other Topics Concern    Not on file   Social History Narrative    Not on file     Social Determinants of Health     Financial Resource Strain: Not on file   Food Insecurity: Not on file   Transportation Needs: Not on file   Physical Activity: Not on file   Stress: Not on file   Social Connections: Not on file   Intimate Partner Violence: Not on file   Housing Stability: Not on file     Allergies   Allergen Reactions    Glipizide Other (See Comments)     Extreme muscle pain    Simvastatin Diarrhea        I have reviewed and confirmed the past medical history, surgical history, social history, allergies in the chart. Medications: I have reviewed the medication list in the chart.     Review of Systems:  Review of Systems - History obtained from the patient  General ROS:

## 2023-04-24 NOTE — DISCHARGE INSTRUCTIONS
Thaddeus Dhillon  [address] [facility] [address]  Phone: [phone]  Fax:  [phone]    POST-PROCEDURE INSTRUCTIONS FOR COLONOSCOPY    Daryamaria guadalupe Trotter  4/24/2023    You underwent an colonoscopy today      You were found to have Small internal hemorrhoids    You may experience: Increased amount of gas. Slight abdominal bloating and/or cramping. Diet:  Special diet high fiber diet. Plan: You may resume your prescribed medications  . For colon cancer screening in this average-risk patient, colonoscopy may be repeated in 10 years    Activity: no driving today. Ok to resume regular activity starting tomorrow    Follow-up: Please call [phone] with any questions or concerns and to schedule a follow up visit as needed with Dr. Jacob Dhillon.       At the following locations:  [address] [facility] [address]   [address] [facility], [address] [facility]   [address] [facility], [address] [facility] [place] [facility], [place] [address]

## 2023-04-24 NOTE — PROGRESS NOTES
Discharge instructions reviewed with patient and patient's wife. Understanding stated of instructions.   Electronically signed by Cuco Peralta RN on 4/24/2023 at 9:32 AM

## 2023-04-24 NOTE — OP NOTE
[address] [place] [address]  LOWER ENDOSCOPY REPORT    DATE OF PROCEDURE: 4/24/2023    SURGEON: Lesley Harden M.D.    Thad Hightower: None    PREOPERATIVE DIAGNOSIS: Low risk colorectal cancer screening    POSTOPERATIVE DIAGNOSIS: Same; mild internal hemorrhoids    OPERATION: Total colonoscopy to cecum      ANESTHESIA: Local monitored anesthesia. ESTIMATED BLOOD LOSS: less than 5 ml    COMPLICATIONS: None. SPECIMENS:  Was Not Obtained    HISTORY: The patient is a 50y.o. year old male with history of above preop diagnosis. I recommended colonoscopy with possible biopsy or polypectomy and I explained the risk, benefits, expected outcome, and alternatives to the procedure. Risks included but are not limited to bleeding, infection, respiratory distress, hypotension, and perforation of the colon. The patient understands and is in agreement. PROCEDURE: The patient was given IV conscious sedation per anesthesia. The patient was given supplemental oxygen by nasal cannula. I performed a digital rectal exam and no masses were palpated . The colonoscope was inserted per rectum and advanced under direct vision to the cecum without difficulty. The prep was good so exam was adequate. FINDINGS:  Cecum/Ascending colon: appendiceal orifice noted, iliocecal valve visualized, normal    Transverse colon: normal    Descending/Sigmoid colon: normal    Rectum/Anus: examined in normal and retroflexed positions and was abnormal: mild internal hemorrhoids    The colon was decompressed and the scope was removed. The withdraw time was approximately 7 minutes. The patient tolerated the procedure well.      ASSESSMENT/PLAN:   High Fiber diet  Colorectal Cancer Screening - recommend repeat colonoscopy in 10 years      Lesley Harden MD, FACS  4/24/2023  8:42 AM

## 2023-11-09 ENCOUNTER — HOSPITAL ENCOUNTER (OUTPATIENT)
Age: 53
Discharge: HOME | End: 2023-11-09
Payer: COMMERCIAL

## 2023-11-09 LAB
ALBUMIN SERPL-MCNC: 4 G/DL (ref 3.5–5.2)
ALP SERPL-CCNC: 95 U/L (ref 40–129)
ALT SERPL-CCNC: 14 U/L (ref 0–40)
ANION GAP SERPL CALCULATED.3IONS-SCNC: 9 MMOL/L (ref 7–16)
AST SERPL-CCNC: 16 U/L (ref 0–39)
BILIRUB SERPL-MCNC: 0.7 MG/DL (ref 0–1.2)
BUN SERPL-MCNC: 18 MG/DL (ref 6–20)
CALCIUM SERPL-MCNC: 8.8 MG/DL (ref 8.6–10.2)
CHLORIDE SERPL-SCNC: 103 MMOL/L (ref 98–107)
CHOLEST SERPL-MCNC: 191 MG/DL
CO2 SERPL-SCNC: 27 MMOL/L (ref 22–29)
CREAT SERPL-MCNC: 0.9 MG/DL (ref 0.7–1.2)
ERYTHROCYTE [DISTWIDTH] IN BLOOD BY AUTOMATED COUNT: 13.8 % (ref 11.5–15)
GFR SERPL CREATININE-BSD FRML MDRD: >60 ML/MIN/1.73M2
GLUCOSE SERPL-MCNC: 135 MG/DL (ref 74–99)
HBA1C MFR BLD: 8 % (ref 4–5.6)
HCT VFR BLD AUTO: 49 % (ref 37–54)
HDLC SERPL-MCNC: 43 MG/DL
HGB BLD-MCNC: 15.6 G/DL (ref 12.5–16.5)
LDLC SERPL CALC-MCNC: 122 MG/DL
MCH RBC QN AUTO: 27.4 PG (ref 26–35)
MCHC RBC AUTO-ENTMCNC: 31.8 G/DL (ref 32–34.5)
MCV RBC AUTO: 86 FL (ref 80–99.9)
MICROALBUMIN UR-MCNC: <12 MG/L (ref 0–19)
PLATELET # BLD AUTO: 357 K/UL (ref 130–450)
PMV BLD AUTO: 9.7 FL (ref 7–12)
POTASSIUM SERPL-SCNC: 4.3 MMOL/L (ref 3.5–5)
PROT SERPL-MCNC: 7.1 G/DL (ref 6.4–8.3)
PSA SERPL-MCNC: 0.64 NG/ML (ref 0–4)
RBC # BLD AUTO: 5.7 M/UL (ref 3.8–5.8)
SODIUM SERPL-SCNC: 139 MMOL/L (ref 132–146)
TRIGL SERPL-MCNC: 131 MG/DL
TSH SERPL DL<=0.05 MIU/L-ACNC: 0.92 UIU/ML (ref 0.27–4.2)
VLDLC SERPL CALC-MCNC: 26 MG/DL
WBC OTHER # BLD: 8.2 K/UL (ref 4.5–11.5)

## 2023-11-09 PROCEDURE — 84443 ASSAY THYROID STIM HORMONE: CPT

## 2023-11-09 PROCEDURE — G0103 PSA SCREENING: HCPCS

## 2023-11-09 PROCEDURE — 80061 LIPID PANEL: CPT

## 2023-11-09 PROCEDURE — 83036 HEMOGLOBIN GLYCOSYLATED A1C: CPT

## 2023-11-09 PROCEDURE — 36415 COLL VENOUS BLD VENIPUNCTURE: CPT

## 2023-11-09 PROCEDURE — 85027 COMPLETE CBC AUTOMATED: CPT

## 2023-11-09 PROCEDURE — 80053 COMPREHEN METABOLIC PANEL: CPT

## 2023-11-09 PROCEDURE — 82043 UR ALBUMIN QUANTITATIVE: CPT

## 2023-11-10 ENCOUNTER — APPOINTMENT (OUTPATIENT)
Dept: GENERAL RADIOLOGY | Age: 53
End: 2023-11-10
Payer: COMMERCIAL

## 2023-11-10 ENCOUNTER — HOSPITAL ENCOUNTER (EMERGENCY)
Age: 53
Discharge: HOME | End: 2023-11-10
Payer: COMMERCIAL

## 2023-11-10 VITALS
DIASTOLIC BLOOD PRESSURE: 88 MMHG | SYSTOLIC BLOOD PRESSURE: 154 MMHG | OXYGEN SATURATION: 98 % | TEMPERATURE: 97.4 F | WEIGHT: 275 LBS | HEART RATE: 77 BPM | BODY MASS INDEX: 37.3 KG/M2 | RESPIRATION RATE: 18 BRPM

## 2023-11-10 DIAGNOSIS — M70.22 OLECRANON BURSITIS OF LEFT ELBOW: Primary | ICD-10-CM

## 2023-11-10 PROCEDURE — 99211 OFF/OP EST MAY X REQ PHY/QHP: CPT

## 2023-11-10 PROCEDURE — 73080 X-RAY EXAM OF ELBOW: CPT

## 2023-11-10 ASSESSMENT — PAIN DESCRIPTION - PAIN TYPE: TYPE: ACUTE PAIN

## 2023-11-10 ASSESSMENT — PAIN DESCRIPTION - FREQUENCY: FREQUENCY: CONTINUOUS

## 2023-11-10 ASSESSMENT — PAIN DESCRIPTION - ORIENTATION: ORIENTATION: LEFT

## 2023-11-10 ASSESSMENT — PAIN DESCRIPTION - DESCRIPTORS: DESCRIPTORS: DISCOMFORT

## 2023-11-10 ASSESSMENT — PAIN DESCRIPTION - ONSET: ONSET: GRADUAL

## 2023-11-10 ASSESSMENT — PAIN DESCRIPTION - LOCATION: LOCATION: ELBOW

## 2023-11-10 ASSESSMENT — PAIN SCALES - GENERAL: PAINLEVEL_OUTOF10: 3

## 2023-11-10 ASSESSMENT — PAIN - FUNCTIONAL ASSESSMENT: PAIN_FUNCTIONAL_ASSESSMENT: 0-10

## 2023-11-10 NOTE — DISCHARGE INSTRUCTIONS
The \"goose egg\" on your elbow is your olecranon bursa, this is some of the fluid from the bursa in your elbow leaking outward. Please keep the area compressed with an Ace wrap for the next 1 week. You may remove the Ace wrap for showering. If your symptoms do not improve please follow-up with orthopedics as you may benefit from draining the area. You may use ibuprofen as needed for pain. The olecranon bursa should shrink down on its own as long as you maintain compression to the area.

## (undated) DEVICE — DEFENDO AIR WATER SUCTION AND BIOPSY VALVE KIT FOR  OLYMPUS: Brand: DEFENDO AIR/WATER/SUCTION AND BIOPSY VALVE

## (undated) DEVICE — GAUZE,SPONGE,4"X4",8PLY,STRL,LF,10/TRAY: Brand: MEDLINE

## (undated) DEVICE — Z DISCONTINUED NO SUB IDED TUBING ETCO2 AD L6.5FT NSL ORAL CVD PRNG NONFLARED TIP OVR

## (undated) DEVICE — CONNECTOR IRRIGATION AUXILIARY H2O JET W/ PRT MTL THRD HYDR